# Patient Record
Sex: FEMALE | Race: BLACK OR AFRICAN AMERICAN | NOT HISPANIC OR LATINO | Employment: FULL TIME | URBAN - METROPOLITAN AREA
[De-identification: names, ages, dates, MRNs, and addresses within clinical notes are randomized per-mention and may not be internally consistent; named-entity substitution may affect disease eponyms.]

---

## 2024-04-18 ENCOUNTER — APPOINTMENT (OUTPATIENT)
Dept: OBSTETRICS AND GYNECOLOGY | Facility: CLINIC | Age: 34
End: 2024-04-18
Payer: COMMERCIAL

## 2024-08-26 ENCOUNTER — INITIAL PRENATAL (OUTPATIENT)
Dept: OBSTETRICS AND GYNECOLOGY | Facility: HOSPITAL | Age: 34
End: 2024-08-26
Payer: COMMERCIAL

## 2024-08-26 VITALS
WEIGHT: 152 LBS | SYSTOLIC BLOOD PRESSURE: 122 MMHG | BODY MASS INDEX: 20.59 KG/M2 | DIASTOLIC BLOOD PRESSURE: 77 MMHG | HEIGHT: 72 IN

## 2024-08-26 DIAGNOSIS — O20.9 VAGINAL BLEEDING AFFECTING EARLY PREGNANCY (HHS-HCC): ICD-10-CM

## 2024-08-26 DIAGNOSIS — Z34.90 PRENATAL CARE, ANTEPARTUM (HHS-HCC): Primary | ICD-10-CM

## 2024-08-26 DIAGNOSIS — N89.8 VAGINAL ODOR: ICD-10-CM

## 2024-08-26 DIAGNOSIS — O21.9 NAUSEA AND VOMITING IN PREGNANCY PRIOR TO 22 WEEKS GESTATION (HHS-HCC): ICD-10-CM

## 2024-08-26 DIAGNOSIS — Z12.4 CERVICAL CANCER SCREENING: ICD-10-CM

## 2024-08-26 PROBLEM — O34.41 HISTORY OF LOOP ELECTROSURGICAL EXCISION PROCEDURE (LEEP) OF CERVIX AFFECTING PREGNANCY IN FIRST TRIMESTER (HHS-HCC): Status: ACTIVE | Noted: 2024-08-26

## 2024-08-26 PROBLEM — O09.521 MULTIGRAVIDA OF ADVANCED MATERNAL AGE IN FIRST TRIMESTER (HHS-HCC): Status: ACTIVE | Noted: 2024-08-26

## 2024-08-26 PROBLEM — Z98.890 HISTORY OF LOOP ELECTROSURGICAL EXCISION PROCEDURE (LEEP) OF CERVIX AFFECTING PREGNANCY IN FIRST TRIMESTER (HHS-HCC): Status: ACTIVE | Noted: 2024-08-26

## 2024-08-26 PROBLEM — Z3A.08 8 WEEKS GESTATION OF PREGNANCY (HHS-HCC): Status: ACTIVE | Noted: 2024-08-26

## 2024-08-26 PROCEDURE — 87205 SMEAR GRAM STAIN: CPT

## 2024-08-26 PROCEDURE — 87661 TRICHOMONAS VAGINALIS AMPLIF: CPT

## 2024-08-26 PROCEDURE — 87086 URINE CULTURE/COLONY COUNT: CPT

## 2024-08-26 PROCEDURE — 99204 OFFICE O/P NEW MOD 45 MIN: CPT

## 2024-08-26 PROCEDURE — 87491 CHLMYD TRACH DNA AMP PROBE: CPT

## 2024-08-26 PROCEDURE — 99214 OFFICE O/P EST MOD 30 MIN: CPT

## 2024-08-26 RX ORDER — PYRIDOXINE HCL (VITAMIN B6) 25 MG
50 TABLET ORAL 3 TIMES DAILY PRN
Qty: 90 TABLET | Refills: 2 | Status: SHIPPED | OUTPATIENT
Start: 2024-08-26 | End: 2024-11-24

## 2024-08-26 SDOH — ECONOMIC STABILITY: FOOD INSECURITY: WITHIN THE PAST 12 MONTHS, THE FOOD YOU BOUGHT JUST DIDN'T LAST AND YOU DIDN'T HAVE MONEY TO GET MORE.: SOMETIMES TRUE

## 2024-08-26 SDOH — ECONOMIC STABILITY: FOOD INSECURITY: WITHIN THE PAST 12 MONTHS, YOU WORRIED THAT YOUR FOOD WOULD RUN OUT BEFORE YOU GOT MONEY TO BUY MORE.: SOMETIMES TRUE

## 2024-08-26 SDOH — ECONOMIC STABILITY: TRANSPORTATION INSECURITY
IN THE PAST 12 MONTHS, HAS THE LACK OF TRANSPORTATION KEPT YOU FROM MEDICAL APPOINTMENTS OR FROM GETTING MEDICATIONS?: NO

## 2024-08-26 SDOH — ECONOMIC STABILITY: TRANSPORTATION INSECURITY
IN THE PAST 12 MONTHS, HAS LACK OF TRANSPORTATION KEPT YOU FROM MEETINGS, WORK, OR FROM GETTING THINGS NEEDED FOR DAILY LIVING?: NO

## 2024-08-26 ASSESSMENT — EDINBURGH POSTNATAL DEPRESSION SCALE (EPDS)
I HAVE BEEN ANXIOUS OR WORRIED FOR NO GOOD REASON: YES, SOMETIMES
I HAVE LOOKED FORWARD WITH ENJOYMENT TO THINGS: AS MUCH AS I EVER DID
TOTAL SCORE: 6
THINGS HAVE BEEN GETTING ON TOP OF ME: NO, MOST OF THE TIME I HAVE COPED QUITE WELL
I HAVE BLAMED MYSELF UNNECESSARILY WHEN THINGS WENT WRONG: NOT VERY OFTEN
I HAVE BEEN SO UNHAPPY THAT I HAVE BEEN CRYING: NO, NEVER
I HAVE BEEN SO UNHAPPY THAT I HAVE HAD DIFFICULTY SLEEPING: NOT VERY OFTEN
I HAVE BEEN ABLE TO LAUGH AND SEE THE FUNNY SIDE OF THINGS: AS MUCH AS I ALWAYS COULD
I HAVE FELT SAD OR MISERABLE: NO, NOT AT ALL
THE THOUGHT OF HARMING MYSELF HAS OCCURRED TO ME: NEVER
I HAVE FELT SCARED OR PANICKY FOR NO GOOD REASON: NO, NOT MUCH

## 2024-08-26 ASSESSMENT — ENCOUNTER SYMPTOMS
LOSS OF SENSATION IN FEET: 0
DEPRESSION: 0
OCCASIONAL FEELINGS OF UNSTEADINESS: 0

## 2024-08-26 NOTE — PROGRESS NOTES
"Assessment/Plan     Routine Prenatal Care  - Patient appropriate for and desires midwifery service  - Oriented to practice, including available collaboration and consulting with physicians.  - Discussed routine OB labs, including serum STI/HIV, CBC, blood type and screen. Serum labs to be deferred until next visit.   - Education provided r/t nutrition, folic acid supplementation, dietary guidelines, exercise, travel, smoking, alcohol, caffeine, and drug use.  - Dating / viability ultrasound ordered   - Pt counseled on genetic testing options and provided literature in the obstetric folder. First line screening options, including cffDNA and NT ultrasound, were discussed and offered.  Benefits, drawbacks, and limitations explained. Pt expresses interest in cffDNA. Will collect next visit once greater than 10 weeks gestation.   - Warning s/s discussed and SAB precautions reviewed. Pt provided office phone number and instructed on when to call.    Nausea During Pregnancy  - Discussed lifestyle modifications including small frequent meals, keeping dry crackers at bedside to eat upon awakening  - Discussed complementary treatments including ariane products and P6 acupressure (\"Sea[employer]ands\")  - 25-50mg Vitamin B6 TID sent to pt preferred pharmacy  - 12.5mg Unisom nightly sent to pt preferred pharmacy    Vaginal Odor  - vaginitis swab collected; will await results prior to initiating treatment  - advised against use of Boric Acid suppositories given unknown safety during pregnancy    Pre-pregnancy BMI 19.00   - Normal weight in Pregnancy - BMI 18.5-24.9; weight gain of 25-35lbs through healthy eating habits reviewed     ASA Prophylaxis  - Pt DOES meet criteria for ASA prophylaxis. Will start 162mg ASA daily at 12-16 weeks..     Health Maintenance  - counseled on seatbelt use, continued bi-annual visits for dental care, and annual visits with PCP  - COVID: Not vaccinated; pt declines vaccination  - Flu: Not yet vaccinated for " [id number] season; pt will receive it through her work at [employer]    Cervical Cancer Screening  - PAP due; collected today  - ASCCP guidelines reviewed with patient; next PAP due in 3 yrs due to history of LEEP    F/U 4 weeks. Serum NOB labs and cffDNA at next visit. Review dating US at next visit. Send Rx for ASA.     Barbara Marin, RENATA-CNM    Subjective     Kennedi Avendaño is a 33 y.o.  at 8w6d with a working estimated date of delivery of 2025, by Last Menstrual Period who presents for an initial prenatal visit. This pregnancy is planned.   Partner: boyfriend Dio  Employment: works as PCA at [employer]     Patient currently experiencing: hunger, intermittent nausea  Also endorses vaginal odor x3-4 days, denies known causative factors. She does report a history of yeast infections in prior pregnancies. She denies associated itching or irritation today. She did administer Boric Acid vaginal suppositories on Saturday.     LMP: certain 24; Prior menstrual cycle regular q 28-30 days.   Bleeding or cramping since LMP: yes - pt states she noticed spotting with wiping only, for the first time this AM. She does report associated mild cramping, rated as a 1-2/10 severity.  She reports having had intercourse yesterday evening. Denies recent trauma to the abdomen.   Taking prenatal vitamin: No  Ultrasound completed this pregnancy: No    OB History    Para Term  AB Living   5 3 3   1 3   SAB IAB Ectopic Multiple Live Births     1     3      # Outcome Date GA Lbr [place]/2nd Weight Sex Type Anes PTL Lv   5 Current            4 IAB            3 Term 13   2.438 kg F Vag-Spont EPI  VICKI   2 Term 08   2.523 kg M Vag-Spont None  VICKI   1 Term 07   2.438 kg M Vag-Spont None  VICKI     [place]  Depression Scale Total: 6  Prior pregnancy complications:  Denies    Preeclampsia Risk  Moderate risk factors include:  Race and Age =/> 35. High risk factors include:  "None.    PAP History: last PAP 4-5 years ago and nml, per pt  Pt reports a history of abnormal PAP and a LEEP procedure 11 years ago    Past Medical History:   Diagnosis Date    Asthma ([facility])     occasional inhaler use during the winter    Ovarian cyst     Pericardial effusion ([facility]-HCC)     pt states it was found incidentally several years ago      Past Surgical History:   Procedure Laterality Date    BREAST BIOPSY  09/18/2015    Biopsy Breast Percutaneous Needle Core    CERVICAL BIOPSY  W/ LOOP ELECTRODE EXCISION  09/18/2015    Cervical Loop Electrosurgical Excision (LEEP)      Social History     Tobacco Use    Smoking status: Never    Smokeless tobacco: Never   Vaping Use    Vaping status: Never Used   Substance Use Topics    Alcohol use: Not Currently    Drug use: Not Currently      Current Medications: None    Objective     /77   Ht 1.905 m (6' 3\")   Wt 68.9 kg (152 lb)   LMP 06/25/2024 (Exact Date)   BMI 19.00 kg/m²     Physical Exam  Vitals reviewed.   Constitutional:       General: She is not in acute distress.     Appearance: Normal appearance.   HENT:      Head: Normocephalic and atraumatic.   Cardiovascular:      Rate and Rhythm: Normal rate and regular rhythm.      Heart sounds: Normal heart sounds, S1 normal and S2 normal.   Pulmonary:      Effort: Pulmonary effort is normal.      Breath sounds: Normal breath sounds.   Abdominal:      General: Abdomen is flat.      Palpations: Abdomen is soft.      Tenderness: There is no abdominal tenderness.      Comments: Gravid uterus   Genitourinary:     General: Normal vulva.      Exam position: Lithotomy position.      Pubic Area: No rash.       Labia:         Right: No rash or lesion.         Left: No rash or lesion.       Urethra: No prolapse, urethral swelling or urethral lesion.      Vagina: Bleeding present.      Cervix: No cervical motion tenderness, discharge, lesion or cervical bleeding.      Uterus: Enlarged. Not tender.       Adnexa:         " Right: No mass or tenderness.          Left: No mass or tenderness.        Comments: Uterine size appropriate for gestational age; cervix closed and long. Scant amount of dark red, mucoid blood noted in vaginal vault.   Musculoskeletal:         General: Normal range of motion.      Cervical back: Normal range of motion.   Skin:     General: Skin is warm and dry.   Neurological:      Mental Status: She is alert and oriented to person, place, and time.   Psychiatric:         Mood and Affect: Mood normal.         Behavior: Behavior normal.         Thought Content: Thought content normal.         Judgment: Judgment normal.          Postpartum Depression: Medium Risk (2024)    [place]  Depression Scale     Last EPDS Total Score: 6     Last EPDS Self Harm Result: Never        Pregnancy Problems (from 24 to present)       Problem Noted Resolved    8 weeks gestation of pregnancy ([facility]) 2024 by CHEPE Wolf No    Priority:  Medium      Overview Addendum 2024 11:58 AM by CHEPE Wolf     Desired provider in labor: [x] CNM  [] Physician  [] Blood Products: [] Yes, accepts [] No, needs counseling  [x] Initial BMI: 19.00   [] Prenatal Labs:   [x] Cervical Cancer Screening up to date: PAP collected   [] Rh status:   [] Genetic Screening:    [] NT US: (11-13 wks)  [] Baby ASA (if indicated): risk factors include AMA,  race  [] Pregnancy dated by:     [] Anatomy US: (19-20 wks)  [] Federal Sterilization consent signed (if indicated):  [] 1hr GCT at 24-28wks:  [] Rhogam (if indicated):   [] Fetal Surveillance (if indicated):  [] Tdap (27-32 wks, may be given up to 36 wks if initial window missed):   [] RSV (32-36 wks) (Sept. to end of ):   [] Flu Vaccine:    [] Breastfeeding:  [] Postpartum Birth control method:   [] GBS at 36 - 37 wks:  [] 39 weeks discussion of IOL vs. Expectant management:  [] Mode of delivery ( anticipated ):           Multigravida of advanced maternal age in first trimester ([facility]) 8/26/2024 by CHEPE Wolf No    Priority:  Medium

## 2024-08-27 LAB
BACTERIA UR CULT: NORMAL
BACTERIAL VAGINOSIS VAG-IMP: NORMAL
C TRACH RRNA SPEC QL NAA+PROBE: NEGATIVE
CLUE CELLS VAG LPF-#/AREA: NORMAL /[LPF]
N GONORRHOEA DNA SPEC QL PROBE+SIG AMP: NEGATIVE
NUGENT SCORE: 4
T VAGINALIS RRNA SPEC QL NAA+PROBE: NEGATIVE
YEAST VAG WET PREP-#/AREA: NORMAL

## 2024-09-04 ENCOUNTER — TELEPHONE (OUTPATIENT)
Dept: OBSTETRICS AND GYNECOLOGY | Facility: HOSPITAL | Age: 34
End: 2024-09-04
Payer: COMMERCIAL

## 2024-09-04 LAB
CYTOLOGY CMNT CVX/VAG CYTO-IMP: NORMAL
HPV HR 12 DNA GENITAL QL NAA+PROBE: POSITIVE
HPV HR GENOTYPES PNL CVX NAA+PROBE: POSITIVE
HPV16 DNA SPEC QL NAA+PROBE: NEGATIVE
HPV18 DNA SPEC QL NAA+PROBE: NEGATIVE
LAB AP HPV GENOTYPE QUESTION: YES
LAB AP HPV HR: NORMAL
LABORATORY COMMENT REPORT: NORMAL
LABORATORY COMMENT REPORT: NORMAL
PATH REPORT.TOTAL CANCER: NORMAL

## 2024-09-04 NOTE — TELEPHONE ENCOUNTER
----- Message from Jeni Howard sent at 2024  3:47 PM EDT -----  Needs an OB colpo     RN placed call to patient to discuss pap results.   Patient identified by name and .  Patient informed her pap results came back with abnormal cells referred to as ASCUS  Patient notified that pap also came back with +HPV  Patient educated that HPV is a virus that is transmitted sexually and can cause cancer and/or genital warts over time.  Patient educated that she will need a colposcopy  Explained to patient that a colposcopy is an in office procedure that starts out like a normal pap but the provider will use a colposcope to get a better view of the abnormal cells on the cervix. The provider will then take a small biopsy to send to the lab to make sure nothing comes back abnormal  Informed patient that since she is pregnant the provider will likely not take a biopsy and will just examen her cervix  Patient scheduled for OB colpo on  with Dr. Thorpe  Encouraged patient to call office with any questions or concerns   Lizzy Grullon RN

## 2024-09-11 DIAGNOSIS — B37.9 YEAST INFECTION: Primary | ICD-10-CM

## 2024-09-11 NOTE — TELEPHONE ENCOUNTER
Returned call to patient to discuss concerns. Patient identified by name and . Patient currently 11w1d and is having c/o vaginal itching and an increase in discharge. Patient believes she has a yeast infection and is requesting treatment. Pharmacy verified. Will pend terconazole to provider. Patient denies any other symptoms at this time.   Lizzy Grullon RN

## 2024-09-12 RX ORDER — TERCONAZOLE 4 MG/G
1 CREAM VAGINAL NIGHTLY
Qty: 45 G | Refills: 0 | Status: SHIPPED | OUTPATIENT
Start: 2024-09-12 | End: 2024-09-19

## 2024-09-13 NOTE — TELEPHONE ENCOUNTER
RN left voicemail to inform patient script was sent to pharmacy and should be ready for pickup.    Bettina APONTEN, RN

## 2024-09-22 DIAGNOSIS — Z34.90 PRENATAL CARE, ANTEPARTUM (HHS-HCC): ICD-10-CM

## 2024-09-23 ENCOUNTER — APPOINTMENT (OUTPATIENT)
Dept: OBSTETRICS AND GYNECOLOGY | Facility: HOSPITAL | Age: 34
End: 2024-09-23
Payer: COMMERCIAL

## 2024-09-23 RX ORDER — B-COMPLEX WITH VITAMIN C
1 TABLET ORAL DAILY
Qty: 90 TABLET | Refills: 3 | Status: SHIPPED | OUTPATIENT
Start: 2024-09-23

## 2024-09-30 ENCOUNTER — APPOINTMENT (OUTPATIENT)
Dept: OBSTETRICS AND GYNECOLOGY | Facility: HOSPITAL | Age: 34
End: 2024-09-30
Payer: COMMERCIAL

## 2024-10-17 ENCOUNTER — TELEPHONE (OUTPATIENT)
Dept: OBSTETRICS AND GYNECOLOGY | Facility: HOSPITAL | Age: 34
End: 2024-10-17
Payer: COMMERCIAL

## 2024-10-17 NOTE — TELEPHONE ENCOUNTER
Patient no showed ob colpo 10/16  Called patient to reschedule  Patient had termination on 10/4  Rescheduled colpo for 11/21 with Dr. Castillo  Encouraged patient to call office with any questions or concerns  Lizzy Grullon RN

## 2024-10-21 ENCOUNTER — APPOINTMENT (OUTPATIENT)
Dept: OBSTETRICS AND GYNECOLOGY | Facility: HOSPITAL | Age: 34
End: 2024-10-21
Payer: COMMERCIAL

## 2024-11-22 ENCOUNTER — TELEPHONE (OUTPATIENT)
Dept: OBSTETRICS AND GYNECOLOGY | Facility: HOSPITAL | Age: 34
End: 2024-11-22
Payer: COMMERCIAL